# Patient Record
Sex: FEMALE | Race: WHITE | HISPANIC OR LATINO | URBAN - METROPOLITAN AREA
[De-identification: names, ages, dates, MRNs, and addresses within clinical notes are randomized per-mention and may not be internally consistent; named-entity substitution may affect disease eponyms.]

---

## 2023-04-24 ENCOUNTER — TELEPHONE (OUTPATIENT)
Dept: INTERNAL MEDICINE CLINIC | Facility: CLINIC | Age: 48
End: 2023-04-24

## 2023-04-24 NOTE — TELEPHONE ENCOUNTER
The patient is scheduled for a breast pain follow up tomorrow: 4/24  This appointment can be cancelled as her mammogram was normal  She should follow up with GYN instead  I can place a referral if needed and please give her the information to schedule   Thank you

## 2023-04-24 NOTE — TELEPHONE ENCOUNTER
Spoke with patient and referred her to GYN  I gave patient the number to the [facility] office  Patient would like a referral just in case it is needed  Please call patient once referral is placed so that she can call GYN to schedule

## 2023-07-03 ENCOUNTER — PATIENT OUTREACH (OUTPATIENT)
Dept: INTERNAL MEDICINE CLINIC | Facility: CLINIC | Age: 48
End: 2023-07-03

## 2023-07-03 NOTE — PROGRESS NOTES
SW CM referral received 3/22 from JEREMIAH Mayer r/t pt [facility] needs for food insecurity. Chart review completed. Per chart review, pt has been in the [address] 2 years now from [facility]. Per chart, pt works PT as a . Per chart pt also reported financial resource strain in paying for basic needs. OP SWCM called pt using  line with  Adolfo Puckett. OP SWCM left VM introducing self, role and reason for calling and asked pt to return call to help with resources. OP SWCM will await return call from pt and f/u as needed, offering assistance getting pt connected with food zamarripa or pantries, as well as financial assistance programs.

## 2023-07-20 ENCOUNTER — PATIENT OUTREACH (OUTPATIENT)
Dept: INTERNAL MEDICINE CLINIC | Facility: CLINIC | Age: 48
End: 2023-07-20

## 2023-07-20 NOTE — PROGRESS NOTES
SW has reached out topt via [employer] ID # [id number] but unfortunately was unable to reach pt to f/u on referral for Financial Resources. SW to mail out an out reach letter this date and will remain available to assist as indicated.

## 2023-07-20 NOTE — LETTER
07/20/23    Estimado/a Luke Jasso un coordinador de la atención médica en [address] [address] [address] [phone]. Intentamos comunicarnos con usted por teléfono varias veces. Es importante que se comunique con nosotros al Dept: [phone] para que podamos ofrecerle ayuda con mechelle necesidades de [facility]. Atentamente.          Damaso Cristobal

## 2023-08-16 ENCOUNTER — OFFICE VISIT (OUTPATIENT)
Dept: DENTISTRY | Facility: CLINIC | Age: 48
End: 2023-08-16

## 2023-08-16 VITALS — SYSTOLIC BLOOD PRESSURE: 126 MMHG | HEART RATE: 76 BPM | DIASTOLIC BLOOD PRESSURE: 83 MMHG

## 2023-08-16 DIAGNOSIS — Z01.20 ENCOUNTER FOR DENTAL EXAMINATION: Primary | ICD-10-CM

## 2023-08-16 PROCEDURE — D0210 INTRAORAL - COMPLETE SERIES OF RADIOGRAPHIC IMAGES: HCPCS

## 2023-08-16 PROCEDURE — D0150 COMPREHENSIVE ORAL EVALUATION - NEW OR ESTABLISHED PATIENT: HCPCS

## 2023-08-16 NOTE — DENTAL PROCEDURE DETAILS
COMP/FMX    [facility] [id number] and [id number]    CC: Aprroximately 4 months ago my UL molar area (pointing to #14 B) was sensitive but I switched by toothpaste since and it feels better now. Reviewed meds/hhx in Epic. ASA 1    FMX taken  Full Mouth Perio charting completed  PERIO: 1B  Treatment Recommended: Adult prophy    Dr Ricardo Hamilton Exam:  1 supraerupted w/ extensive Mesial caries, recommend ext  2 O   14 O, B recession watch  Recommend ext of #16 in the future. 18 O  20 defective margins on large existing resin, recommend crown  30 O    NV: Adult prophy  2) EXT #1  3) Start restos.

## 2023-09-15 ENCOUNTER — OFFICE VISIT (OUTPATIENT)
Dept: OBGYN CLINIC | Facility: CLINIC | Age: 48
End: 2023-09-15

## 2023-09-15 VITALS
DIASTOLIC BLOOD PRESSURE: 84 MMHG | SYSTOLIC BLOOD PRESSURE: 148 MMHG | HEART RATE: 77 BPM | BODY MASS INDEX: 21.16 KG/M2 | HEIGHT: 63 IN | WEIGHT: 119.4 LBS

## 2023-09-15 DIAGNOSIS — N93.9 ABNORMAL UTERINE BLEEDING (AUB): ICD-10-CM

## 2023-09-15 DIAGNOSIS — N76.0 BV (BACTERIAL VAGINOSIS): ICD-10-CM

## 2023-09-15 DIAGNOSIS — B96.89 BV (BACTERIAL VAGINOSIS): ICD-10-CM

## 2023-09-15 DIAGNOSIS — N89.8 VAGINAL ODOR: ICD-10-CM

## 2023-09-15 DIAGNOSIS — N89.8 VAGINAL DISCHARGE: Primary | ICD-10-CM

## 2023-09-15 RX ORDER — METRONIDAZOLE 500 MG/1
500 TABLET ORAL 2 TIMES DAILY
Qty: 14 TABLET | Refills: 0 | Status: SHIPPED | OUTPATIENT
Start: 2023-09-15 | End: 2023-09-22

## 2023-09-15 NOTE — PROGRESS NOTES
PROBLEM GYNECOLOGICAL VISIT    Aleksey Urias is a 50 y.o. female who presents today with complaint of vaginal discharge. Her general medical history has been reviewed and she reports it as follows:    Past Medical History:   Diagnosis Date   • Asthma      History reviewed. No pertinent surgical history. OB History        1    Para   1    Term   1            AB        Living           SAB        IAB        Ectopic        Multiple        Live Births                   Social History     Tobacco Use   • Smoking status: Never   • Smokeless tobacco: Never   Vaping Use   • Vaping Use: Never used   Substance Use Topics   • Alcohol use: Never   • Drug use: Never     Social History     Substance and Sexual Activity   Sexual Activity Not Currently       Current Outpatient Medications   Medication Instructions   • metroNIDAZOLE (FLAGYL) 500 mg, Oral, 2 times daily       History of Present Illness:   Nicki Max presents today reporting at least a month of vaginal discharge and an odor. She denies any itching or irritation. No new sexual partner. No new hygiene products. She also reports a change in her menses. Over the last two months her periods have been lasting for over 7 days. In the past her periods had only lasted 3-4 days. She denies any heavy bleeding or blood clots. She currently has her menses. She denies any pelvic pain. Review of Systems:  Review of Systems   Constitutional: Negative. Gastrointestinal: Negative. Genitourinary: Positive for menstrual problem and vaginal discharge. Physical Exam:  /84 (BP Location: Right arm)   Pulse 77   Ht 5' 3" (1.6 m)   Wt 54.2 kg (119 lb 6.4 oz)   LMP 2023 (Exact Date)   BMI 21.15 kg/m²   Physical Exam  Constitutional:       General: She is not in acute distress. Appearance: Normal appearance. Genitourinary:      Vulva normal.      Vaginal exam comments: Small to moderate amount of menstrual bleeding .      Neurological: Mental Status: She is alert. Skin:     General: Skin is warm and dry. Psychiatric:         Mood and Affect: Mood normal.         Behavior: Behavior normal.   Vitals reviewed. Assessment:   1. Vaginal discharge   2. Vaginal odor    3. AUB     Plan:   1. Unable to visualize wet mount due to obstruction from menstrual blood. Discussed that abnormal discharge and odor are common symptoms of bacterial vaginosis. We can treat empirically with Flagyl now and if symptoms do not improve she was encouraged to return after menses for repeat exam.    2. Discussed that AUB can be related to perimenopausal stage. Orders placed for pelvic US to rule out structural causes. 3. Return for annual exam and f/u to US results. Reviewed with patient that test results are available in [employer] immediately, but that they will not necessarily be reviewed by me immediately. Explained that I will review results at my earliest opportunity and contact patient appropriately.

## 2023-09-29 ENCOUNTER — HOSPITAL ENCOUNTER (OUTPATIENT)
Dept: RADIOLOGY | Facility: HOSPITAL | Age: 48
Discharge: HOME | End: 2023-09-29
Payer: COMMERCIAL

## 2023-09-29 DIAGNOSIS — N93.9 ABNORMAL UTERINE BLEEDING (AUB): ICD-10-CM

## 2023-09-29 PROCEDURE — 76830 TRANSVAGINAL US NON-OB: CPT

## 2023-09-29 PROCEDURE — 76856 US EXAM PELVIC COMPLETE: CPT

## 2023-10-24 NOTE — PROGRESS NOTES
OB/GYN VISIT  Marty Andrade  10/26/2023  3:00 PM    Subjective:     Ina Krueger is a 46 y.o.  female who presents for vaginal discharge. She was seen in the clinic last month for the same complaint and was given metronidazole for empiric treatment of BV (wet mount was obscured by menstrual blood). She says that she is having the same discharge as before. She took the medication and says that it was better for a bit and then came back. Patient describes the discharge as thick white and having an odor. Nothing is visualized on exam.   Discussed with patient that there was nothing present on either her speculum exam or her slides under the microscope. Discussed that this indicates there is likely not an infectious cause of the discharges she is experiencing, and therefore nothing that we can give her medication for. Counseled patient on only using soap and water to wash the area as opposed to any douching products. Instructed patient to return to office if symptoms are worse. Past Medical History:   Diagnosis Date    Asthma      No past surgical history on file. Objective:    Vitals: Blood pressure 117/77, pulse 79, resp. rate 18, height 5' 3" (1.6 m), weight 56 kg (123 lb 6.4 oz), last menstrual period 2023. Body mass index is 21.86 kg/m². Physical Exam  Constitutional:       General: She is not in acute distress. Appearance: Normal appearance. HENT:      Head: Normocephalic and atraumatic. Cardiovascular:      Rate and Rhythm: Normal rate. Pulses: Normal pulses. Pulmonary:      Effort: Pulmonary effort is normal. No respiratory distress. Breath sounds: Normal breath sounds. Abdominal:      General: Abdomen is flat. Palpations: Abdomen is soft. Genitourinary:     Comments: Very small amount of watery, physiologic appearing discharge present   Skin:     General: Skin is warm and dry. Neurological:      General: No focal deficit present. Mental Status: She is alert. Psychiatric:         Mood and Affect: Mood normal.         Behavior: Behavior normal.           Assessment/Plan:  Problem List Items Addressed This Visit          Other    Vaginal discharge - Primary     Patient seen for same 1 month ago  Was prescribed metronidazole at this time  Patient says the discharge returned approximately 2 weeks ago  Nothing seen on speculum or microscopy  Instructed to return to clinic if worsening or increasing symptoms           Relevant Orders    POCT wet mount       D/w Dr. Philly Wheeler MD  10/26/2023  3:00 PM        Please note that while the recent [facility] permits medical records be visible for patient review, clinical documentation is intended for utilization by healthcare professionals. All test results are immediately available through [employer] as well, and we will review results at earliest opportunity and contact you with the appropriate urgency. If you have a question about something you see in your chart, please don't hesitate to ask about it at your next appointment.

## 2023-10-26 ENCOUNTER — OFFICE VISIT (OUTPATIENT)
Dept: OBGYN CLINIC | Facility: CLINIC | Age: 48
End: 2023-10-26

## 2023-10-26 VITALS
HEIGHT: 63 IN | HEART RATE: 79 BPM | WEIGHT: 123.4 LBS | SYSTOLIC BLOOD PRESSURE: 117 MMHG | DIASTOLIC BLOOD PRESSURE: 77 MMHG | BODY MASS INDEX: 21.86 KG/M2 | RESPIRATION RATE: 18 BRPM

## 2023-10-26 DIAGNOSIS — N89.8 VAGINAL DISCHARGE: Primary | ICD-10-CM

## 2023-10-26 LAB
BV WHIFF TEST VAG QL: NORMAL
CLUE CELLS SPEC QL WET PREP: NORMAL
PH SMN: NORMAL [PH]
SL AMB POCT WET MOUNT: NORMAL
T VAGINALIS VAG QL WET PREP: NORMAL
YEAST VAG QL WET PREP: NORMAL

## 2023-10-26 NOTE — ASSESSMENT & PLAN NOTE
Patient seen for same 1 month ago  Was prescribed metronidazole at this time  Patient says the discharge returned approximately 2 weeks ago  Nothing seen on speculum or microscopy  Instructed to return to clinic if worsening or increasing symptoms

## 2024-02-28 ENCOUNTER — OFFICE VISIT (OUTPATIENT)
Dept: INTERNAL MEDICINE CLINIC | Facility: CLINIC | Age: 49
End: 2024-02-28

## 2024-02-28 ENCOUNTER — TELEPHONE (OUTPATIENT)
Dept: INTERNAL MEDICINE CLINIC | Facility: CLINIC | Age: 49
End: 2024-02-28

## 2024-02-28 VITALS
SYSTOLIC BLOOD PRESSURE: 125 MMHG | BODY MASS INDEX: 21.97 KG/M2 | HEIGHT: 63 IN | DIASTOLIC BLOOD PRESSURE: 80 MMHG | WEIGHT: 124 LBS | HEART RATE: 87 BPM | TEMPERATURE: 98.1 F

## 2024-02-28 DIAGNOSIS — M54.50 ACUTE RIGHT-SIDED LOW BACK PAIN WITHOUT SCIATICA: ICD-10-CM

## 2024-02-28 DIAGNOSIS — R31.29 OTHER MICROSCOPIC HEMATURIA: Primary | ICD-10-CM

## 2024-02-28 PROBLEM — Z00.00 ANNUAL PHYSICAL EXAM: Status: RESOLVED | Noted: 2023-03-22 | Resolved: 2024-02-28

## 2024-02-28 PROBLEM — N64.4 BREAST PAIN: Status: RESOLVED | Noted: 2023-03-22 | Resolved: 2024-02-28

## 2024-02-28 PROBLEM — N89.8 VAGINAL DISCHARGE: Status: RESOLVED | Noted: 2023-10-26 | Resolved: 2024-02-28

## 2024-02-28 LAB
SL AMB  POCT GLUCOSE, UA: NEGATIVE
SL AMB LEUKOCYTE ESTERASE,UA: NEGATIVE
SL AMB POCT BILIRUBIN,UA: NORMAL
SL AMB POCT BLOOD,UA: NORMAL
SL AMB POCT CLARITY,UA: NORMAL
SL AMB POCT COLOR,UA: YELLOW
SL AMB POCT KETONES,UA: NORMAL
SL AMB POCT NITRITE,UA: NEGATIVE
SL AMB POCT PH,UA: 5
SL AMB POCT SPECIFIC GRAVITY,UA: 1.01
SL AMB POCT URINE PROTEIN: NORMAL
SL AMB POCT UROBILINOGEN: 0.2

## 2024-02-28 PROCEDURE — 81002 URINALYSIS NONAUTO W/O SCOPE: CPT | Performed by: PHYSICIAN ASSISTANT

## 2024-02-28 PROCEDURE — 99214 OFFICE O/P EST MOD 30 MIN: CPT | Performed by: PHYSICIAN ASSISTANT

## 2024-02-28 NOTE — ASSESSMENT & PLAN NOTE
Urine dip unremarkable other than large blood. She states she has not noticed any vaginal bleeding in at least 5 days. With back pain and abdominal tenderness, recommend ruling out stone. She was agreeable. If stone study unremarkable, hematuria likely residual from menses and back pain musculoskeletal. Pain was not reproducible and she did not have and tenderness to the touch. If stone present recommend Flomax and hydration. Strain urine for stone. If muscular, recommend supportive treatment and can prescribe pain medication such as naproxen. ER precautions given.

## 2024-02-28 NOTE — PROGRESS NOTES
Name: Shama Andrade      : 1975      MRN: [id number]  Encounter Provider: Diane Chambers PA-C  Encounter Date: 2024   Encounter department: [facility]    Assessment & Plan     1. Other microscopic hematuria  Assessment & Plan:  Urine dip unremarkable other than large blood. She states she has not noticed any vaginal bleeding in at least 5 days. With back pain and abdominal tenderness, recommend ruling out stone. She was agreeable. If stone study unremarkable, hematuria likely residual from menses and back pain musculoskeletal. Pain was not reproducible and she did not have and tenderness to the touch. If stone present recommend Flomax and hydration. Strain urine for stone. If muscular, recommend supportive treatment and can prescribe pain medication such as naproxen. ER precautions given.     Orders:  -     POCT urine dip  -     CT renal stone study abdomen pelvis wo contrast; Future; Expected date: 2024    2. Acute right-sided low back pain without sciatica  -     POCT urine dip  -     CT renal stone study abdomen pelvis wo contrast; Future; Expected date: 2024           Subjective      Patient is a 48 year old female presenting for a same day appointment. She is complaining of right back pain for 2 days. She can not describe the pain, buts states it is 8/10 pain. Severity varies, but the pain is constant. Denies radiation. Denies numbness, tingling, weakness. Denies saddle anesthesia and fecal/urinary incontinence. Admits to her urine color being much darker and having an odd odor to it. Denies hematuria. Denies dysuria, frequency, or urgency. Denies fever or chills. Denies nausea or vomiting. Denies pelvic pain or vaginal discharge. Her last period began over a week ago and ended at least 5 days ago.   Use of  services was utilized during visit.      Review of Systems   Constitutional:  Negative for appetite change, chills,  "diaphoresis, fatigue, fever and unexpected weight change.   Respiratory:  Negative for cough, shortness of breath and wheezing.    Cardiovascular:  Negative for chest pain, palpitations and leg swelling.   Gastrointestinal:  Negative for abdominal distention, abdominal pain, blood in stool, constipation, diarrhea, nausea and vomiting.   Genitourinary:  Negative for decreased urine volume, difficulty urinating, dysuria, flank pain, frequency, hematuria, pelvic pain, urgency, vaginal bleeding, vaginal discharge and vaginal pain.   Musculoskeletal:  Positive for back pain. Negative for arthralgias and myalgias.   Skin:  Negative for rash.   Neurological:  Negative for dizziness, weakness, light-headedness, numbness and headaches.   Hematological:  Negative for adenopathy.       No current outpatient medications on file prior to visit.       Objective     /80 (BP Location: Left arm, Patient Position: Sitting, Cuff Size: Adult)   Pulse 87   Temp 98.1 °F (36.7 °C) (Temporal)   Ht 5' 3\" (1.6 m)   Wt 56.2 kg (124 lb)   LMP 02/25/2024 (Exact Date)   BMI 21.97 kg/m²     Physical Exam  Vitals and nursing note reviewed.   Constitutional:       General: She is awake. She is not in acute distress.     Appearance: Normal appearance. She is well-developed, well-groomed and normal weight. She is not ill-appearing.   HENT:      Head: Normocephalic and atraumatic.   Eyes:      General: No scleral icterus.     Conjunctiva/sclera: Conjunctivae normal.   Cardiovascular:      Rate and Rhythm: Normal rate and regular rhythm.      Pulses: Normal pulses.      Heart sounds: Normal heart sounds. No murmur heard.  Pulmonary:      Effort: Pulmonary effort is normal. No respiratory distress.      Breath sounds: Normal breath sounds and air entry. No decreased air movement. No decreased breath sounds, wheezing, rhonchi or rales.   Abdominal:      General: Abdomen is flat. Bowel sounds are normal. There is no distension.      " Palpations: Abdomen is soft. There is no mass.      Tenderness: There is abdominal tenderness in the right lower quadrant. There is no right CVA tenderness, left CVA tenderness, guarding or rebound. Negative signs include Kauffman's sign, Rovsing's sign and McBurney's sign.      Hernia: No hernia is present.   Musculoskeletal:         General: Normal range of motion.      Cervical back: Neck supple.      Thoracic back: Normal.      Lumbar back: Normal. No swelling, edema, deformity, signs of trauma, lacerations, spasms, tenderness or bony tenderness. Normal range of motion. Negative right straight leg raise test and negative left straight leg raise test. No scoliosis.   Lymphadenopathy:      Cervical: No cervical adenopathy.   Skin:     General: Skin is warm.      Coloration: Skin is not jaundiced.      Findings: No rash.   Neurological:      General: No focal deficit present.      Mental Status: She is alert and oriented to person, place, and time. Mental status is at baseline.      Motor: Motor function is intact.      Coordination: Coordination is intact.      Gait: Gait is intact.   Psychiatric:         Attention and Perception: Attention normal.         Mood and Affect: Mood and affect normal.         Speech: Speech normal.         Behavior: Behavior normal. Behavior is cooperative.       Diane Chambers PA-C

## 2024-02-28 NOTE — TELEPHONE ENCOUNTER
Voicemail received - patient wanted to schedule an appt has back pain.      Returned call received no answer

## 2024-03-01 ENCOUNTER — HOSPITAL ENCOUNTER (OUTPATIENT)
Dept: RADIOLOGY | Age: 49
Discharge: HOME | End: 2024-03-01

## 2024-03-01 DIAGNOSIS — M54.50 ACUTE RIGHT-SIDED LOW BACK PAIN WITHOUT SCIATICA: ICD-10-CM

## 2024-03-01 DIAGNOSIS — R31.29 OTHER MICROSCOPIC HEMATURIA: ICD-10-CM

## 2024-03-01 PROCEDURE — 74176 CT ABD & PELVIS W/O CONTRAST: CPT

## 2024-03-05 ENCOUNTER — TELEPHONE (OUTPATIENT)
Dept: INTERNAL MEDICINE CLINIC | Facility: CLINIC | Age: 49
End: 2024-03-05

## 2024-03-05 ENCOUNTER — OFFICE VISIT (OUTPATIENT)
Dept: INTERNAL MEDICINE CLINIC | Facility: CLINIC | Age: 49
End: 2024-03-05

## 2024-03-05 VITALS
BODY MASS INDEX: 22.32 KG/M2 | DIASTOLIC BLOOD PRESSURE: 77 MMHG | WEIGHT: 126 LBS | TEMPERATURE: 97.9 F | SYSTOLIC BLOOD PRESSURE: 112 MMHG | HEART RATE: 86 BPM

## 2024-03-05 DIAGNOSIS — R31.29 OTHER MICROSCOPIC HEMATURIA: ICD-10-CM

## 2024-03-05 DIAGNOSIS — M54.50 ACUTE RIGHT-SIDED LOW BACK PAIN WITHOUT SCIATICA: Primary | ICD-10-CM

## 2024-03-05 DIAGNOSIS — Z86.018 HISTORY OF UTERINE FIBROID: ICD-10-CM

## 2024-03-05 LAB
SL AMB  POCT GLUCOSE, UA: NEGATIVE
SL AMB LEUKOCYTE ESTERASE,UA: NEGATIVE
SL AMB POCT BILIRUBIN,UA: NEGATIVE
SL AMB POCT BLOOD,UA: NORMAL
SL AMB POCT CLARITY,UA: NORMAL
SL AMB POCT COLOR,UA: YELLOW
SL AMB POCT KETONES,UA: 80
SL AMB POCT NITRITE,UA: NEGATIVE
SL AMB POCT PH,UA: 6
SL AMB POCT SPECIFIC GRAVITY,UA: 1.01
SL AMB POCT URINE PROTEIN: NEGATIVE
SL AMB POCT UROBILINOGEN: 0.2

## 2024-03-05 PROCEDURE — 99213 OFFICE O/P EST LOW 20 MIN: CPT | Performed by: STUDENT IN AN ORGANIZED HEALTH CARE EDUCATION/TRAINING PROGRAM

## 2024-03-05 PROCEDURE — 87086 URINE CULTURE/COLONY COUNT: CPT

## 2024-03-05 PROCEDURE — 81002 URINALYSIS NONAUTO W/O SCOPE: CPT | Performed by: STUDENT IN AN ORGANIZED HEALTH CARE EDUCATION/TRAINING PROGRAM

## 2024-03-05 NOTE — PATIENT INSTRUCTIONS
Please Return to Clinic in 6 weeks in order to follow up this back pain    Please call [facility] at [phone] in order to make a new appointment for annual well visit and to discuss your worsening period pain recently  Please call central scheduling at [phone] in order to schedule your ultrasound  You will likely be called by Urology which I am referring you to in order to get imaging done of your bladder to rule out any masses  Please get lab work at your convenience before next visit with [facility] in order to make sure you are not losing any blood

## 2024-03-05 NOTE — PROGRESS NOTES
[facility]  INTERNAL MEDICINE OFFICE VISIT     PATIENT INFORMATION     Shama Andrade   48 y.o. female   MRN: [id number]    ASSESSMENT/PLAN     48-year-old woman with history of left uterine fibroid here for continued right-sided back pain.  Of note she was here 4 days prior to presentation with what seems like potential nephrolithiasis, however CT renal stone was unremarkable.  And now seems instead that this is pain related to her menses, as she states that it began as right lower quadrant pain (which she has been noticing colicky pain in this region over her past 3 menstrual periods) and has since radiated to her back after her menstrual period ended.    Given the historical features of this pain, and previous imaging significant for  masses, we are now more concerned for either a bladder or gynecological mass as the culprit of his pain.    Other possibilities include right ovarian cyst, however TVUS done about 4 months prior to current presentation showed normal-appearing right ovarian architecture.  Therefore would be unusual for cyst to develop in this quick of a time, however not completely out of the question.    Again, we would like to rule out possibilities of  mass at this time rather than looking further for stone.  There are no concerns for intra-abdominal pathology, such as appendicitis, given an unremarkable abdomen on exam.    Plan:    POC urine with small blood today to be reflexed to scope  As discussed from last visit, the because of large blood on her previous POC urine may have been menses related'  Recommended patient reach out to her GYN to discuss worsening menstrual pain, and need for annual well visit/cervical cancer screening  Referred to urology for cystoscopy.  Would like to rule out exophytic mass in bladder or other possible causes of this pain  Referred for TVUS in order to rule out gynecological mass.  Do note that prior study did not  get good visualization of left ovary    Diagnoses and all orders for this visit:    Acute right-sided low back pain without sciatica  -     POCT urine dip  -     Ambulatory Referral to Urology; Future  -     US abdomen and pelvis with transvaginal; Future  -     Urine culture; Future  -     Urine culture    Other microscopic hematuria  -     Ambulatory Referral to Urology; Future  -     CBC and differential; Future    History of uterine fibroid  -     US abdomen and pelvis with transvaginal; Future        Schedule a follow-up appointment in 6 weeks for f/u of R-sided LBP including imaging and specialist visits.        HEALTH MAINTENANCE     There is no immunization history on file for this patient.    CHIEF COMPLAINT     Chief Complaint   Patient presents with   • Follow-up        HISTORY OF PRESENT ILLNESS     Interval Hx:    Miss Chirinos is a 47 y/o woman with h/o Asthma who comes here to review findings from recent CT renal stone. Of note she was seen by SW about a week prior to current presentation where she was c/o R BP for ~2 wk's. Spot Urine dip showed large blood however exam had no reproducible abdominal/flank pain. CT renal stone was ordered to r/o nephrolithiasis. She was also given short-term course of Flomax and told to hydrate, as well as to strain urine to look for crystals/stone.    Do note that CT Renal Stone found no significant bladder/ureteral/renal findings.     HPI:    Today Miss Boyd is seen in clinic in mild distress. She is still c/o R-sided back pain, but has not noticed any stones or crystals in urine. She is having no pain with nor difficulty urinating. Her R back pain is still constant & isolated to the lower back, and is only slightly relieved by taking Advil. She again denies any weakness/numbness in all extremities, and has no radicular pain.     The pt herself believes this pain is related to her 'ovaries' as this pain started as 'colicky' in her RLQ abdomen during the end of her  last period. Per pt LMP was 2 days prior to last visit (ie, 2/28/2024). She says that she does not usual get these pains with her period, except for the past 3 months where she has been noticing this colicky pain.     REVIEW OF SYSTEMS     Review of Systems   Constitutional:  Negative for chills, fatigue and fever.   HENT:  Negative for ear pain, postnasal drip, rhinorrhea and sore throat.    Eyes:  Negative for pain and visual disturbance.   Respiratory:  Negative for cough and shortness of breath.    Cardiovascular:  Negative for chest pain and palpitations.   Gastrointestinal:  Negative for abdominal pain and vomiting.   Endocrine: Negative for polyuria.   Genitourinary:  Negative for dysuria, hematuria, pelvic pain, urgency and vaginal bleeding.   Musculoskeletal:  Positive for back pain (Isolated to R-lower back). Negative for arthralgias.   Skin:  Negative for color change and rash.   Neurological:  Negative for seizures and syncope.   Hematological:  Does not bruise/bleed easily.   All other systems reviewed and are negative.      OBJECTIVE     Vitals:    03/05/24 1503   BP: 112/77   BP Location: Right arm   Patient Position: Sitting   Cuff Size: Large   Pulse: 86   Temp: 97.9 °F (36.6 °C)   TempSrc: Temporal   Weight: 57.2 kg (126 lb)     Physical Exam  Vitals and nursing note reviewed.   Constitutional:       General: She is not in acute distress.     Appearance: She is well-developed.   HENT:      Head: Normocephalic and atraumatic.      Nose: No congestion or rhinorrhea.   Eyes:      Conjunctiva/sclera: Conjunctivae normal.   Cardiovascular:      Rate and Rhythm: Normal rate and regular rhythm.      Heart sounds: No murmur heard.  Pulmonary:      Effort: Pulmonary effort is normal. No respiratory distress.      Breath sounds: Normal breath sounds.   Abdominal:      General: Abdomen is flat. There is no distension.      Palpations: Abdomen is soft. There is no mass.      Tenderness: There is no abdominal  tenderness. There is right CVA tenderness (mild). There is no left CVA tenderness or rebound.   Musculoskeletal:         General: No swelling.      Cervical back: Neck supple.   Skin:     General: Skin is warm and dry.      Capillary Refill: Capillary refill takes less than 2 seconds.   Neurological:      Mental Status: She is alert.   Psychiatric:         Mood and Affect: Mood normal.         CURRENT MEDICATIONS   No current outpatient medications on file.    PAST MEDICAL & SURGICAL HISTORY     Past Medical History:   Diagnosis Date   • Asthma      History reviewed. No pertinent surgical history.    SOCIAL & FAMILY HISTORY     Social History     Socioeconomic History   • Marital status: Legally      Spouse name: Not on file   • Number of children: Not on file   • Years of education: Not on file   • Highest education level: Not on file   Occupational History   • Not on file   Tobacco Use   • Smoking status: Never   • Smokeless tobacco: Never   Vaping Use   • Vaping status: Never Used   Substance and Sexual Activity   • Alcohol use: Never   • Drug use: Never   • Sexual activity: Not Currently   Other Topics Concern   • Not on file   Social History Narrative   • Not on file     Social Determinants of Health     Financial Resource Strain: Low Risk  (2/28/2024)    Overall Financial Resource Strain (CARDIA)    • Difficulty of Paying Living Expenses: Not hard at all   Food Insecurity: No Food Insecurity (2/28/2024)    Hunger Vital Sign    • Worried About Running Out of Food in the Last Year: Never true    • Ran Out of Food in the Last Year: Never true   Transportation Needs: No Transportation Needs (2/28/2024)    PRAPARE - Transportation    • Lack of Transportation (Medical): No    • Lack of Transportation (Non-Medical): No   Physical Activity: Not on file   Stress: Not on file   Social Connections: Not on file   Intimate Partner Violence: Not on file   Housing Stability: Low Risk  (9/15/2023)    Housing  Stability Vital Sign    • Unable to Pay for Housing in the Last Year: No    • Number of Places Lived in the Last Year: 1    • Unstable Housing in the Last Year: No     Social History     Substance and Sexual Activity   Alcohol Use Never       Social History     Substance and Sexual Activity   Drug Use Never     Social History     Tobacco Use   Smoking Status Never   Smokeless Tobacco Never     Family History   Problem Relation Age of Onset   • Hypertension Mother    • Prostate cancer Father        = == == == == == == == == == == == == == == == == == == == == == == == == == == == == == ==    Cole Hanley MD  Internal Medicine Resident, PGY-2  [facility], [address], [address]  [place] [address]  Office: [phone]  Fax: [phone]

## 2024-03-07 LAB — BACTERIA UR CULT: NORMAL

## 2025-07-09 ENCOUNTER — TELEPHONE (OUTPATIENT)
Dept: INTERNAL MEDICINE CLINIC | Facility: CLINIC | Age: 50
End: 2025-07-09

## 2025-07-09 NOTE — TELEPHONE ENCOUNTER
Spoke to patient and she stated she is currently being seen at [facility] and no longer wishes to make an appt with our office. Please remove pcp from patients chart

## 2025-07-10 ENCOUNTER — DOCUMENTATION (OUTPATIENT)
Dept: ADMINISTRATIVE | Facility: OTHER | Age: 50
End: 2025-07-10

## 2025-07-10 NOTE — TELEPHONE ENCOUNTER
07/10/25 7:12 AM     The office's request has been received and reviewed.    The PCP has been successfully removed with a patient attribution note.     This message will now be completed.    Thank you  Bonita Staley